# Patient Record
Sex: MALE | Race: WHITE | Employment: STUDENT | URBAN - METROPOLITAN AREA
[De-identification: names, ages, dates, MRNs, and addresses within clinical notes are randomized per-mention and may not be internally consistent; named-entity substitution may affect disease eponyms.]

---
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
## 2023-01-23 ENCOUNTER — OFFICE VISIT (OUTPATIENT)
Dept: NEUROLOGY | Age: 23
End: 2023-01-23
Payer: COMMERCIAL

## 2023-01-23 DIAGNOSIS — F41.1 GENERALIZED ANXIETY DISORDER: Primary | ICD-10-CM

## 2023-01-23 DIAGNOSIS — R41.840 INATTENTION: ICD-10-CM

## 2023-01-23 PROCEDURE — 90791 PSYCH DIAGNOSTIC EVALUATION: CPT | Performed by: CLINICAL NEUROPSYCHOLOGIST

## 2023-01-23 NOTE — PROGRESS NOTES
[address] [facility],5Th Floor  [address]   [place] [address] [place] [place] [address] [address]   [phone] Office   [phone] Fax      Neuropsychology    Initial Diagnostic Interview Note      Referral:  PCP    Shania Fernandez is a 23 y.o. right handed  male who was unaccompanied to the initial clinical interview on 1/23/23. Please refer to his medical records for details pertaining to his history. At the start of the appointment, I reviewed the patient's [facility] Epic Chart (including Media scanned in from previous providers) for the active Problem List, all pertinent Past Medical Hx, medications, recent radiologic and laboratory findings. In addition, I reviewed pt's documented Immunization Record and Encounter History. He completed high school without history of previously diagnosed LD and/or receipt of special education services. Works full time at [employer] in [employer]. He has been having a hard time with focus and attention and concentration. Easily distracted. Starts tasks and does not complete. Loses train of thought. Loses track of things. Hard to stay organized. He has been dealing with anxiety. Wondering if this is a cyclically exacerbating problem. Meditation is helpful. He still has ongoing issues with focus and attention. Medically healthy. Zones out. Neurologic history is otherwise negative. Notices that he zones out with friends. He is \"everywhere\" in his head. He was on medication when he was younger but it did not help - mood and focus were the target interventions. Sleep schedule varies. Appetite is on and off. No known family history of ADHD, mood related problems. People have commented about this. Likes to stay active, on the river, playing football. Growing up- there was divorce, it did not go well, did therapy. Last tx was six years ago (medications), last therapy was three years ago.  Lost his mom two years ago. Chronic illness. He will think sometimes it was his fault he wasn't there to help her. She took her own life on his birthday. No previous neuropsych. Neuropsychological Mental Status Exam (NMSE):      Historian: Good  Praxis: No UE apraxia  R/L Orientation: Intact to self and to other  Dress: within normal limits   Weight: within normal limits   Appearance/Hygiene: within normal limits   Gait: within normal limits   Assistive Devices: None  Mood: within normal limits   Affect: within normal limits   Comprehension: within normal limits   Thought Process: within normal limits   Expressive Language: within normal limits   Receptive Language: within normal limits   Motor:  No cognitive or motor perseveration  ETOH: Denied  Tobacco: vapes nicotine  Marijuana: Denied  Illicit: Denied  SI/HI: Denied  Psychosis: Denied  Insight: Within normal limits  Judgment: Within normal limits  Other Psych:              Plan:  Obtain authorization for testing from [employer]. Report to follow once testing, scoring, and interpretation completed. ? Organic based neurocognitive issues versus mood disorder or combination of same. ? Problems organic, functional, or both? The patient has not gotten better from any treatment to date. Treatment decisions cannot be appropriately made without testing. The patient is not abusing drugs. There is a suspected brain problem, which can be identified, quantified, and hopefully addressed via neurocognitive and psychological testing. This note will not be viewable in [address]

## 2023-01-31 ENCOUNTER — OFFICE VISIT (OUTPATIENT)
Dept: NEUROLOGY | Age: 23
End: 2023-01-31
Payer: COMMERCIAL

## 2023-01-31 DIAGNOSIS — F43.10 PTSD (POST-TRAUMATIC STRESS DISORDER): ICD-10-CM

## 2023-01-31 DIAGNOSIS — F90.0 ATTENTION DEFICIT HYPERACTIVITY DISORDER (ADHD), INATTENTIVE TYPE, MODERATE: ICD-10-CM

## 2023-01-31 DIAGNOSIS — R45.851 PASSIVE SUICIDAL IDEATIONS: ICD-10-CM

## 2023-01-31 DIAGNOSIS — F41.9 SEVERE ANXIETY: Primary | ICD-10-CM

## 2023-01-31 DIAGNOSIS — F32.2 SEVERE MAJOR DEPRESSION (HCC): ICD-10-CM

## 2023-01-31 DIAGNOSIS — F60.3 BORDERLINE PERSONALITY DISORDER IN ADULT (HCC): ICD-10-CM

## 2023-01-31 PROCEDURE — 96138 PSYCL/NRPSYC TECH 1ST: CPT | Performed by: CLINICAL NEUROPSYCHOLOGIST

## 2023-01-31 PROCEDURE — 96130 PSYCL TST EVAL PHYS/QHP 1ST: CPT | Performed by: CLINICAL NEUROPSYCHOLOGIST

## 2023-01-31 PROCEDURE — 96139 PSYCL/NRPSYC TST TECH EA: CPT | Performed by: CLINICAL NEUROPSYCHOLOGIST

## 2023-01-31 PROCEDURE — 96131 PSYCL TST EVAL PHYS/QHP EA: CPT | Performed by: CLINICAL NEUROPSYCHOLOGIST

## 2023-01-31 PROCEDURE — 96136 PSYCL/NRPSYC TST PHY/QHP 1ST: CPT | Performed by: CLINICAL NEUROPSYCHOLOGIST

## 2023-01-31 PROCEDURE — 96137 PSYCL/NRPSYC TST PHY/QHP EA: CPT | Performed by: CLINICAL NEUROPSYCHOLOGIST

## 2023-01-31 NOTE — LETTER
2/1/2023    Patient: Chicho Coley   YOB: 2000   Date of Visit: 1/31/2023     Mariam Feliz [facility] [phone] [phone]  Via Fax: [phone]    Dear Mariam Feliz MD,      Thank you for referring Mr. Chicho Coley to [facility] for evaluation. My notes for this consultation are attached. If you have questions, please do not hesitate to call me. I look forward to following your patient along with you.       Sincerely,    Beola Duverney, PsyD

## 2023-02-01 NOTE — PROGRESS NOTES
[address] [facility],5Th Floor  [address]   [address] [place] [address] [facility]   Mahin Ruiz   [phone] Office   [phone] Fax      Psychological Evaluation Report  Referral:  PCP    Drew Chacon is a 23 y.o. right handed  male who was unaccompanied to the initial clinical interview on 1/23/23. Please refer to his medical records for details pertaining to his history. At the start of the appointment, I reviewed the patient's [facility] Epic Chart (including Media scanned in from previous providers) for the active Problem List, all pertinent Past Medical Hx, medications, recent radiologic and laboratory findings. In addition, I reviewed pt's documented Immunization Record and Encounter History. He completed high school without history of previously diagnosed LD and/or receipt of special education services. Works full time at [employer] in [place]. He has been having a hard time with focus and attention and concentration. Easily distracted. Starts tasks and does not complete. Loses train of thought. Loses track of things. Hard to stay organized. He has been dealing with anxiety. Wondering if this is a cyclically exacerbating problem. Meditation is helpful. He still has ongoing issues with focus and attention. Medically healthy. Zones out. Neurologic history is otherwise negative. Notices that he zones out with friends. He is \"everywhere\" in his head. He was on medication when he was younger but it did not help - mood and focus were the target interventions. Sleep schedule varies. Appetite is on and off. No known family history of ADHD, mood related problems. People have commented about this. Likes to stay active, on the river, playing football. Growing up- there was divorce, it did not go well, did therapy. Last tx was six years ago (medications), last therapy was three years ago. Lost his mom two years ago. Chronic illness. He will think sometimes it was his fault he wasn't there to help her. She took her own life on his birthday. No previous neuropsych. Neuropsychological Mental Status Exam (NMSE):      Historian: Good  Praxis: No UE apraxia  R/L Orientation: Intact to self and to other  Dress: within normal limits   Weight: within normal limits   Appearance/Hygiene: within normal limits   Gait: within normal limits   Assistive Devices: None  Mood: within normal limits   Affect: within normal limits   Comprehension: within normal limits   Thought Process: within normal limits   Expressive Language: within normal limits   Receptive Language: within normal limits   Motor:  No cognitive or motor perseveration  ETOH: Denied  Tobacco: vapes nicotine  Marijuana: Denied  Illicit: Denied  SI/HI: Denied  Psychosis: Denied  Insight: Within normal limits  Judgment: Within normal limits  Other Psych:    Plan:  Obtain authorization for testing from [employer]. Report to follow once testing, scoring, and interpretation completed. ? Organic based neurocognitive issues versus mood disorder or combination of same. ? Problems organic, functional, or both? The patient has not gotten better from any treatment to date. Treatment decisions cannot be appropriately made without testing. The patient is not abusing drugs. There is a suspected brain problem, which can be identified, quantified, and hopefully addressed via neurocognitive and psychological testing. This note will not be viewable in [address]     Psychological Evaluation Results Follow  Patient Testing 1/31/23 Report Completed 2/1/23  A Psychometrist Assisted w/ portions of this evaluation while under my direct supervision    Neuropsychologist Administered, Interpreted, & Reported: Neuropsychological Mental Status Exam, Revised Memory & Behavior Checklist, MMSE, Clock Drawing, Test Of Premorbid Functioning, Nancey Hams Adult ADD Scales, History Taking  & Clinical Interview With The Patient,  SHANNON, CPT, Monster-Melzack Pain Questionnaire, Review Of Available Records*. Psychometrist Administered & Neuropsychologist Interpreted & Neuropsychologist Reported: Speech-Sounds Perception Test, GABRIELLE, Paced Serial Addition Test, Wechsler Adult Intelligence Scale - IV, Verbal Fluency Tests, Dane & Dane - Revised, Trailmaking Test Parts A & B, Buschke Selective Reminding Test, Ignacio Complex Figure Test, Herr Depression Inventory - II, Herr Anxiety Inventory,. Test Findings:  Note:  The patients raw data have been compared with currently available norms which include demographic corrections for age, gender, and/or education. Sometimes, the patients scores are compared to demographically similar individuals as close to the patients age, education level, etc., as possible. \"Average\" is viewed as being +/- 1 standard deviation (SD) from the stated mean for a particular test score. \"Low average\" is viewed as being between 1 and 2 SD below the mean, and above average is viewed as being 1 and 2 SD above the mean. Scores falling in the borderline range (between 1-1/2 and 2 SD below the mean) are viewed with particular attention as to whether they are normal or abnormal neurocognitive test scores. Other methods of inference in analyzing the test data are also utilized, including the pattern and range of scores in the profile, bilateral motor functions, and the presence, if any, of pathognomonic signs. Behaviorally, the patient was friendly and cooperative and appeared motivated to perform well during this examination. Within this context, the results of this evaluation are viewed as a valid reflection of the patients actual neurocognitive and emotional status. The patient's self-reported score of 77 on the Jacqueline Cliche Adult ADD Scales was within the elevated risk range for ADHD related concerns.        His structured word list fluency, as assessed by the FAS Test, was within the below average range with a T score of 44. Category fluency was within the average range with a T Score of 49. Confrontation naming, as assessed by the [facility] - Revised, was within the average range at 57/60 correct (T = 50). This pattern of performance is not indicative of a patient who is at increased risk for day-to-day problems with verbal fluency and/or confrontation naming. The patient was administered the Donna' Continuous Performance Test-III and review of the subscales within this instrument revealed mild concern for inattentiveness without concern for impulsivity. Auditory attention, as assessed by the GABRIELLE, was mildly impaired. High level auditory information processing speed, as assessed by the PASAT, was within the impaired range for Trial 2 (- 1.68 SD). This pattern of performance is indicative of a patient who is at increased risk for day-to-day problems with sustained visual attention, auditory attention, and high level auditory information processing speed. The patient was administered the Wechsler Adult Intelligence Scale - IV. See Appendix I (in media section of this EMR) for full breakdown of IQ scores. There was a clinically significant difference between his extremely low range Working Memory Index score of 69 (2nd %ile) and his average range Processing Speed Index score of 92 (30th %ile). His Verbal Comprehension Index score of 83 (13th %ile) was low average. His Perceptual Reasoning Index score of 90 (25th %ile) was average. This pattern of performance is indicative of a patient who is at increased risk for day-to-day problems with working memory, which is lower than expected based on his performance on a task assessing premorbid functioning levels. This often signals functional interference.        The patient was administered the Buschke Selective Reminding Test and his basic learning and memory (116/144) was normal.  In this regard, his consistency related long term retrieval was impaired (46/144 correct), especially when compared to his normal range [employer] (101/133). His discrepancy score (+55 points) is clinically significant and is suggestive of a high level cognitive organization problem and/or high level attention concern. Simple timed visual motor sequencing (Trailmaking Test Part A) was within the moderately impaired range with a T score of 29. The patient's performance on a similar, but more complex task of timed visual motor sequencing (Trailmaking Test Part B) was within the moderately impaired range with a T score of 25. The patient made zero sequencing errors on this latter test.  Taken together, this pattern of performance is not indicative of a patient who is at increased risk for day-to-day problems with frontal lobe-mediated executive functioning abilities. The patient rated his current level of pain as \"0/5- No Pain\" on the Monster-Melzack Pain Questionnaire. His Herr Depression Inventory - II score of 37 was within the severely depressed range. His Herr Anxiety Inventory score of 30 reflected severe anxiety. The patient was also administered the Personality Assessment Inventory and generated a valid profile for interpretation. Within this context, marked depression and anxiety issues are present. There are strong support for PTSD. There are strong support for borderline personality disorder. He appears uncertain about major life issues and is quite emotionally labile, manifesting rapid and extreme mood swings along with episodes of poorly controlled anger. His use of drugs have been problematic for him. Overt somatic symptoms of anxiety are present. He is impulsive and prone to behaviors likely to be harmful and self-destructive. There are elements of bonnie here. He is suspicious and hostile in his relationships with other people. Self-concept is harsh and negative.   He reports periodic and transient thoughts of self-harm on this measure and denied currently active plan or intent with me. He is highly motivated for treatment. However, the nature, severity, and complexity of some these issues suggests the treatment will be difficult, with a lengthier treatment process and a higher probability of reversals. Any impassible need to be handled with particular care. 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 It is a moderate problem. He is also showing related problems with high-level cognitive organization. Working memory is low relative to processing speed in a manner often seen in cases of functional interference. Otherwise, his performances across all other neurocognitive domains assessed are within the normal range. From an emotional standpoint, this is a complex case of PTSD, moderate to severe depression, moderate to severe anxiety with somatic features, and borderline personality. The use of drugs and alcohol as been problematic for him within this context. Trauma is a major issue here. His anxiety is significant to the degree whereby his ability to focus and to attend are further compromised. I see the ADHD-inattentive issue is organic and moderate in terms of severity. It is separate from emotional distress, which is more severe and complex. To some degree, these are cyclically exacerbating problems, but I am more concerned about his emotional distress than I am the ADHD-inattentive. In addition to continued medical care, recommendations include consult with psychiatry for management of anxiety, depression, and ADHD-concerns. I strongly advised active engagement in intensive psychotherapy. [address] [facility] may need to be considered here. Consider EMDR. Consider DBT. His use of drugs or alcohol needs to be explored within the therapy context. Organizational skills training may prove helpful as well. With an improvement in mood and attention should bring with it and improve in day-to-day functional memory. If not, a follow-up neuropsychological evaluation with him indicated. I wish him well. That his mother engaged in suicidal behavior deliberately on his birthday should not be minimized. We now have extensive baseline neurocognitive and psychologic data on him. Follow up as needed. Clinical correlation is, of course, indicated. The above information is based upon information currently available to me. If there is any additional information of which I am currently unaware, I would be more than happy to review it upon having it made available to me. Thank you for the opportunity to see this interesting individual.     Sincerely,       Margarita Bassett. Marquetta Gosselin, EdS,LCP    Appendix: IQ Test results (see media section of this EMR)    Cc: Jesus Arroyo MD       Time Documentation:      40106 x 1 42591*3 Test administration/data gathering by Neuropsychologist (see above), 60 minutes  96138 x 1 Test administration, data gathering by technician (1st 30 minutes), 30 minutes  96139 x 5 Test administration, data gathering by technician (each additional 30 minutes), 3 hours (total tech 3 hours)   96130 x 1 Testing Evaluation Services By Neuropsychologist, 1st hour  31440 x 1 Testing Evaluation Services by Neuropsychologist, 2nd hour (45 minutes)  This includes review of referral question, reviewing records, planning test battery (50 minutes prior to testing date), and interpreting data (30 minutes), and interpretation and report writing (50 minutes)       Anticipated Integrated Feedback (50965) - Service to be completed on a future date and not currently billed. The above includes: Record review. Review of history provided by patient. Review of collaborative information. Testing by Clinician. Review of raw data. Scoring. Report writing of individual tests administered by Clinician.   Integration of individual tests administered by psychometrist with NSE/testing by clinician, review of records/history/collaborative information, case Conceptualization, treatment planning, clinical decision making, report writing, coordination Of Care. Psychometry test codes as time spent by psychometrist administering and scoring neurocognitive/psychological tests under supervision of neuropsychologist.  Integral services including scoring of raw data, data interpretation, case conceptualization, report writing etcetera were initiated after the patient finished testing/raw data collected and was completed on the date the report was signed.

## 2023-02-03 ENCOUNTER — TELEPHONE (OUTPATIENT)
Dept: NEUROLOGY | Age: 23
End: 2023-02-03

## 2023-02-03 NOTE — TELEPHONE ENCOUNTER
Patient's father would like to reschedule his son's appt. (Dr. Alvarez Callaway)  for anytime after 4.17.23 . He has not yet cancelled his current appt. Pls.call him . .. [phone]  Thank you

## 2023-02-28 ENCOUNTER — OFFICE VISIT (OUTPATIENT)
Dept: NEUROLOGY | Age: 23
End: 2023-02-28
Payer: COMMERCIAL

## 2023-02-28 DIAGNOSIS — F32.2 SEVERE MAJOR DEPRESSION (HCC): ICD-10-CM

## 2023-02-28 DIAGNOSIS — F90.0 ATTENTION DEFICIT HYPERACTIVITY DISORDER (ADHD), INATTENTIVE TYPE, MODERATE: ICD-10-CM

## 2023-02-28 DIAGNOSIS — F43.10 PTSD (POST-TRAUMATIC STRESS DISORDER): ICD-10-CM

## 2023-02-28 DIAGNOSIS — R45.851 PASSIVE SUICIDAL IDEATIONS: ICD-10-CM

## 2023-02-28 DIAGNOSIS — F41.9 SEVERE ANXIETY: Primary | ICD-10-CM

## 2023-02-28 PROCEDURE — 90832 PSYTX W PT 30 MINUTES: CPT | Performed by: CLINICAL NEUROPSYCHOLOGIST

## 2023-02-28 NOTE — PROGRESS NOTES
Prior to seeing the patient I reviewed the records, including the previously completed report, the records in [place], and any updated visits from other providers since I saw the patient last.      Today, I engaged in a psychoeducational and supportive and cognitive/behavioral psychotherapy session with the patient. I provided psychotherapy in the form of psychoeducation and support with respect to the results of the recent Neuropsychological Evaluation, including discussing individual tests as well as patient's areas of neurocognitive strength versus weakness. 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 It is a moderate problem. He is also showing related problems with high-level cognitive organization. Working memory is low relative to processing speed in a manner often seen in cases of functional interference. Otherwise, his performances across all other neurocognitive domains assessed are within the normal range. From an emotional standpoint, this is a complex case of PTSD, moderate to severe depression, moderate to severe anxiety with somatic features, and borderline personality. The use of drugs and alcohol as been problematic for him within this context. Trauma is a major issue here. His anxiety is significant to the degree whereby his ability to focus and to attend are further compromised. I see the ADHD-inattentive issue is organic and moderate in terms of severity. It is separate from emotional distress, which is more severe and complex. To some degree, these are cyclically exacerbating problems, but I am more concerned about his emotional distress than I am the ADHD-inattentive. In addition to continued medical care, recommendations include consult with psychiatry for management of anxiety, depression, and ADHD-concerns.   I strongly advised active engagement in intensive psychotherapy. [address] [facility] [place] may need to be considered here. Consider EMDR. Consider DBT. His use of drugs or alcohol needs to be explored within the therapy context. Organizational skills training may prove helpful as well. With an improvement in mood and attention should bring with it and improve in day-to-day functional memory. If not, a follow-up neuropsychological evaluation with him indicated. I wish him well. That his mother engaged in suicidal behavior deliberately on his birthday should not be minimized. We now have extensive baseline neurocognitive and psychologic data on him. Follow up as needed. Clinical correlation is, of course, indicated      Education was provided regarding my diagnostic impressions, and we discussed treatment plan/options. I also answered numerous questions related to the clinical findings, including discussing various methods to improve cognition and mood. Counseling provided regarding mood and cognition. CBT and supportive psychotherapy techniques were utilized. Supportive/Cognitive Behavioral/Solution Focused psychotherapy provided  Discussed rational versus irrational thinking patterns and their consequences. Discussed healthy/adaptive and unhealthy/maladaptive coping. The patient needs to follow with psychiatry, psychology and pcp for treatment. Just switched to a new doctor. I would like him to follow up with his new doctor and get treatment initiated for the above. Patient in agreement. Not currently on any medications.  Denied SI/HI, AH/VH    The patient had the following concerns which I deferred to their referring provider: meds for mood/cognition      Time spent today: 20

## 2023-04-25 ENCOUNTER — TRANSCRIBE ORDER (OUTPATIENT)
Dept: SCHEDULING | Age: 23
End: 2023-04-25

## 2023-04-25 DIAGNOSIS — S83.512A SPRAIN OF ANTERIOR CRUCIATE LIGAMENT OF LEFT KNEE: Primary | ICD-10-CM

## 2023-04-25 DIAGNOSIS — M25.562 LEFT KNEE PAIN: ICD-10-CM

## 2023-04-25 DIAGNOSIS — S83.242A ACUTE MEDIAL MENISCUS TEAR OF LEFT KNEE: ICD-10-CM

## 2023-04-26 ENCOUNTER — HOSPITAL ENCOUNTER (OUTPATIENT)
Dept: MRI IMAGING | Age: 23
Discharge: HOME | End: 2023-04-26
Payer: COMMERCIAL

## 2023-04-26 DIAGNOSIS — S83.242A ACUTE MEDIAL MENISCUS TEAR OF LEFT KNEE: ICD-10-CM

## 2023-04-26 DIAGNOSIS — M25.562 LEFT KNEE PAIN: ICD-10-CM

## 2023-04-26 DIAGNOSIS — S83.512A SPRAIN OF ANTERIOR CRUCIATE LIGAMENT OF LEFT KNEE: ICD-10-CM

## 2023-04-26 PROCEDURE — 73721 MRI JNT OF LWR EXTRE W/O DYE: CPT

## 2023-10-22 ENCOUNTER — OFFICE VISIT (OUTPATIENT)
Age: 23
End: 2023-10-22

## 2023-10-22 VITALS
WEIGHT: 154.6 LBS | RESPIRATION RATE: 18 BRPM | OXYGEN SATURATION: 99 % | DIASTOLIC BLOOD PRESSURE: 68 MMHG | SYSTOLIC BLOOD PRESSURE: 122 MMHG | HEIGHT: 70 IN | HEART RATE: 70 BPM | TEMPERATURE: 98.6 F | BODY MASS INDEX: 22.13 KG/M2

## 2023-10-22 DIAGNOSIS — L03.011 PARONYCHIA OF RIGHT MIDDLE FINGER: Primary | ICD-10-CM

## 2023-10-22 RX ORDER — CEPHALEXIN 500 MG/1
500 CAPSULE ORAL 3 TIMES DAILY
Qty: 30 CAPSULE | Refills: 0 | Status: SHIPPED | OUTPATIENT
Start: 2023-10-22 | End: 2023-11-01

## 2023-10-22 RX ORDER — DEXTROAMPHETAMINE SACCHARATE, AMPHETAMINE ASPARTATE, DEXTROAMPHETAMINE SULFATE AND AMPHETAMINE SULFATE 2.5; 2.5; 2.5; 2.5 MG/1; MG/1; MG/1; MG/1
10 TABLET ORAL DAILY
COMMUNITY
Start: 2023-08-17

## 2023-10-22 RX ORDER — ERGOCALCIFEROL 1.25 MG/1
1 CAPSULE ORAL WEEKLY
COMMUNITY
Start: 2023-08-28

## 2023-10-22 ASSESSMENT — ENCOUNTER SYMPTOMS
EYES NEGATIVE: 1
GASTROINTESTINAL NEGATIVE: 1
ALLERGIC/IMMUNOLOGIC NEGATIVE: 1
RESPIRATORY NEGATIVE: 1